# Patient Record
Sex: MALE | Race: WHITE
[De-identification: names, ages, dates, MRNs, and addresses within clinical notes are randomized per-mention and may not be internally consistent; named-entity substitution may affect disease eponyms.]

---

## 2018-06-25 ENCOUNTER — HOSPITAL ENCOUNTER (OUTPATIENT)
Dept: HOSPITAL 68 - STS | Age: 73
End: 2018-06-25
Attending: UROLOGY
Payer: COMMERCIAL

## 2018-06-25 VITALS — BODY MASS INDEX: 29.71 KG/M2 | WEIGHT: 174 LBS | HEIGHT: 64 IN

## 2018-06-25 DIAGNOSIS — K21.9: ICD-10-CM

## 2018-06-25 DIAGNOSIS — D49.4: Primary | ICD-10-CM

## 2018-06-25 DIAGNOSIS — Z87.891: ICD-10-CM

## 2018-06-25 DIAGNOSIS — N32.89: ICD-10-CM

## 2018-06-25 DIAGNOSIS — Z79.82: ICD-10-CM

## 2018-06-25 DIAGNOSIS — N35.9: ICD-10-CM

## 2018-06-25 DIAGNOSIS — I10: ICD-10-CM

## 2018-06-25 DIAGNOSIS — I73.9: ICD-10-CM

## 2018-06-25 DIAGNOSIS — N40.0: ICD-10-CM

## 2018-06-25 DIAGNOSIS — Z86.73: ICD-10-CM

## 2018-06-25 DIAGNOSIS — I25.10: ICD-10-CM

## 2018-06-25 NOTE — OPERATIVE REPORT
Operative/Inv Procedure Report
Surgery Date: 06/25/18
Name of Procedure:
Cystoscopy, urethral dilation in internal urethrotomy
Pre-Operative Diagnosis:
Bladder tumor
Post-Operative Diagnosis:
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
Estimated Blood Loss: less than 50ml
Surgeon/Assistant:
Scottie GONZALES,Jeromy ALMANZA
 
Anesthesia: laryngeal mask airway
Drains:
24 Spanish three-way Villarreal catheter with catheter pulled and inflow port
Specimens:
Urine culture
Complications:
Unable to get resectoscope in bladder due to narrow urethra
Condition:
Stable
Operative Indication:
Bladder tumor seen on office cystoscopy, narrowed urethral strictures seen on 
cystoscopy today
 
Operative/Procedure Note
Note:
The patient was taken to the cystoscopy room identified.  Placed in supine 
position on the cystoscopy table.  A timeout was extruded appropriately the 
patient awake.  Drill anesthesia was induced via LMA.  He was placed in dorsal 
lithotomy position.  Bimanual rectal exam revealed a  mildly enlarged, 
nonmodular approximately were no other pelvic masses.  It was then prepped and 
draped in usual fashion for cystoscopy.  A surgical pause was secured 
appropriately.  22 Spanish cystoscope sheath was initially unable to be placed in
the urethra due to narrowing the fossa navicularis.  Using sounds this area was 
dilated to 26 Spanish.  The 22 Spanish cystoscope sheath was then placed into the 
urethra under direct vision.  There was some narrowing in the bulbar urethra but
the 22 Spanish scope was passed through this area with some difficulty.  The 
prostate was mildly enlarged causing partial bladder construction.  Upon 
entering the bladder cystoscopy was performed.  A 4 cm bladder tumor overlying 
the left trigone.  The left ureteral orifice could not be visualized.  The right
ureteral orifice was normal.  The remainder the bladder was normal.  The bladder
was left full in the cystoscope removed.  A 26 Spanish resectoscope was then 
placed into the urethra using the .  Working element was then placed so 
that it Could be made to place the resectoscope into the bladder under direct 
vision.  However in the bulbar urethra the resectoscope could not be advanced 
due to narrowing.  The resectoscope was removed and this area was dilated to 24 
Spanish sounds.  Following this the cystoscope was placed in the urethra and a 
guidewire placed through the urethra into the bladder.  Using the rectus vision 
internal urethrotome near area of the urethra was incised.  Multiple attempts 
were made to do this.  After each attempt a further attempt was made to place 
the resectoscope.  Over the resectoscope could not be advanced beyond this 
narrow area of the urethra.  Therefore was decided to avoid attempting to place 
the resectoscope into place a Villarreal catheter and allow this area thoroughly 
passively with a Villarreal in place.  Therefore 24 Spanish three-way Villarreal catheter 
was placed over the guidewire into the bladder with moderate difficulty.  
Irrigation fluid drained from the Villarreal.  A catheter plug was placed on the 
inflow port.  10 mL was placed in the bone.  Patient tolerated the procedure 
well and was stable condition.
Findings:
Narrowed urethral bulbar urethra, 4 cm bladder tumor left trigone
Discharge Disposition: PACU